# Patient Record
Sex: MALE | Race: WHITE | Employment: OTHER | URBAN - NONMETROPOLITAN AREA
[De-identification: names, ages, dates, MRNs, and addresses within clinical notes are randomized per-mention and may not be internally consistent; named-entity substitution may affect disease eponyms.]

---

## 2017-08-07 ENCOUNTER — RADIANT APPOINTMENT (OUTPATIENT)
Dept: GENERAL RADIOLOGY | Facility: CLINIC | Age: 62
End: 2017-08-07
Attending: FAMILY MEDICINE
Payer: COMMERCIAL

## 2017-08-07 ENCOUNTER — OFFICE VISIT (OUTPATIENT)
Dept: FAMILY MEDICINE | Facility: CLINIC | Age: 62
End: 2017-08-07
Payer: COMMERCIAL

## 2017-08-07 VITALS
WEIGHT: 180 LBS | HEART RATE: 68 BPM | RESPIRATION RATE: 18 BRPM | SYSTOLIC BLOOD PRESSURE: 144 MMHG | HEIGHT: 70 IN | BODY MASS INDEX: 25.77 KG/M2 | DIASTOLIC BLOOD PRESSURE: 90 MMHG

## 2017-08-07 DIAGNOSIS — Z71.89 ADVANCED DIRECTIVES, COUNSELING/DISCUSSION: ICD-10-CM

## 2017-08-07 DIAGNOSIS — Z12.11 COLON CANCER SCREENING: ICD-10-CM

## 2017-08-07 DIAGNOSIS — M19.041 PRIMARY OSTEOARTHRITIS OF RIGHT HAND: Primary | ICD-10-CM

## 2017-08-07 DIAGNOSIS — E78.2 MIXED HYPERLIPIDEMIA: ICD-10-CM

## 2017-08-07 DIAGNOSIS — M79.641 RIGHT HAND PAIN: ICD-10-CM

## 2017-08-07 DIAGNOSIS — Z00.00 ROUTINE GENERAL MEDICAL EXAMINATION AT A HEALTH CARE FACILITY: Primary | ICD-10-CM

## 2017-08-07 DIAGNOSIS — I10 ESSENTIAL HYPERTENSION, BENIGN: ICD-10-CM

## 2017-08-07 PROBLEM — R73.03 PREDIABETES: Status: ACTIVE | Noted: 2017-08-07

## 2017-08-07 LAB
ANION GAP SERPL CALCULATED.3IONS-SCNC: 8 MMOL/L (ref 3–14)
BUN SERPL-MCNC: 15 MG/DL (ref 7–30)
CALCIUM SERPL-MCNC: 9.4 MG/DL (ref 8.5–10.1)
CHLORIDE SERPL-SCNC: 107 MMOL/L (ref 94–109)
CHOLEST SERPL-MCNC: 220 MG/DL
CO2 SERPL-SCNC: 23 MMOL/L (ref 20–32)
CREAT SERPL-MCNC: 1.06 MG/DL (ref 0.66–1.25)
GFR SERPL CREATININE-BSD FRML MDRD: 70 ML/MIN/1.7M2
GLUCOSE SERPL-MCNC: 108 MG/DL (ref 70–99)
HDLC SERPL-MCNC: 47 MG/DL
LDLC SERPL CALC-MCNC: 139 MG/DL
NONHDLC SERPL-MCNC: 173 MG/DL
POTASSIUM SERPL-SCNC: 4 MMOL/L (ref 3.4–5.3)
SODIUM SERPL-SCNC: 138 MMOL/L (ref 133–144)
TRIGL SERPL-MCNC: 169 MG/DL

## 2017-08-07 PROCEDURE — 36415 COLL VENOUS BLD VENIPUNCTURE: CPT | Performed by: FAMILY MEDICINE

## 2017-08-07 PROCEDURE — 99396 PREV VISIT EST AGE 40-64: CPT | Performed by: FAMILY MEDICINE

## 2017-08-07 PROCEDURE — 80061 LIPID PANEL: CPT | Performed by: FAMILY MEDICINE

## 2017-08-07 PROCEDURE — 80048 BASIC METABOLIC PNL TOTAL CA: CPT | Performed by: FAMILY MEDICINE

## 2017-08-07 PROCEDURE — 73130 X-RAY EXAM OF HAND: CPT | Mod: RT

## 2017-08-07 PROCEDURE — 99213 OFFICE O/P EST LOW 20 MIN: CPT | Mod: 25 | Performed by: FAMILY MEDICINE

## 2017-08-07 RX ORDER — ATORVASTATIN CALCIUM 10 MG/1
10 TABLET, FILM COATED ORAL DAILY
Qty: 90 TABLET | Refills: 1 | Status: SHIPPED | OUTPATIENT
Start: 2017-08-07

## 2017-08-07 RX ORDER — VALSARTAN 40 MG/1
40 TABLET ORAL DAILY
Qty: 90 TABLET | Refills: 3 | Status: SHIPPED | OUTPATIENT
Start: 2017-08-07

## 2017-08-07 NOTE — MR AVS SNAPSHOT
After Visit Summary   8/7/2017    Mono Jennings    MRN: [id number]           Patient Information     Date Of Birth          1955        Visit Information        Provider Department      8/7/2017 8:00 AM Jason Schmid MD [facility]        Today's Diagnoses     Colon cancer screening    -  1    Routine general medical examination at a health care facility        Mixed hyperlipidemia        Essential hypertension, benign        Right hand pain          Care Instructions      Preventive Health Recommendations  Male Ages 50 - 64    Yearly exam:             See your health care provider every year in order to  o   Review health changes.   o   Discuss preventive care.    o   Review your medicines if your doctor has prescribed any.     Have a cholesterol test every 5 years, or more frequently if you are at risk for high cholesterol/heart disease.     Have a diabetes test (fasting glucose) every three years. If you are at risk for diabetes, you should have this test more often.     Have a colonoscopy at age 50, or have a yearly FIT test (stool test). These exams will check for colon cancer.      Talk with your health care provider about whether or not a prostate cancer screening test (PSA) is right for you.    You should be tested each year for STDs (sexually transmitted diseases), if you re at risk.     Shots: Get a flu shot each year. Get a tetanus shot every 10 years.     Nutrition:    Eat at least 5 servings of fruits and vegetables daily.     Eat whole-grain bread, whole-wheat pasta and brown rice instead of white grains and rice.     Talk to your provider about Calcium and Vitamin D.     Lifestyle    Exercise for at least 150 minutes a week (30 minutes a day, 5 days a week). This will help you control your weight and prevent disease.     Limit alcohol to one drink per day.     No smoking.     Wear sunscreen to prevent skin cancer.     See your dentist every six months for an exam  and cleaning.     See your eye doctor every 1 to 2 years.            Follow-ups after your visit        Additional Services     GASTROENTEROLOGY ADULT REF PROCEDURE ONLY       Last Lab Result: No results found for: CR  Body mass index is 26.2 kg/(m^2).     Needed:  No  Language:  English    Patient will be contacted to schedule procedure.     Please be aware that coverage of these services is subject to the terms and limitations of your health insurance plan.  Call member services at your health plan with any benefit or coverage questions.  Any procedures must be performed at a [place] [facility] facility OR coordinated by your clinic's referral office.    Please bring the following with you to your appointment:    (1) Any X-Rays, CTs or MRIs which have been performed.  Contact the facility where they were done to arrange for  prior to your scheduled appointment.    (2) List of current medications   (3) This referral request   (4) Any documents/labs given to you for this referral                  Future tests that were ordered for you today     Open Future Orders        Priority Expected Expires Ordered    XR Hand Right G/E 3 Views STAT 8/7/2017 8/7/2018 8/7/2017            Who to contact     If you have questions or need follow up information about today's clinic visit or your schedule please contact [facility] directly at [phone].  Normal or non-critical lab and imaging results will be communicated to you by MyChart, letter or phone within 4 business days after the clinic has received the results. If you do not hear from us within 7 days, please contact the clinic through MyChart or phone. If you have a critical or abnormal lab result, we will notify you by phone as soon as possible.  Submit refill requests through [employer] or call your pharmacy and they will forward the refill request to us. Please allow 3 business days for your refill to be completed.          Additional Information  "About Your Visit        [employer] Information     [employer] lets you send messages to your doctor, view your test results, renew your prescriptions, schedule appointments and more. To sign up, go to [email or web address] . Click on \"Log in\" on the left side of the screen, which will take you to the Welcome page. Then click on \"Sign up Now\" on the right side of the page.     You will be asked to enter the access code listed below, as well as some personal information. Please follow the directions to create your username and password.     Your access code is: [email or web address]  Expires: 2017  8:22 AM     Your access code will  in 90 days. If you need help or a new code, please call your [facility] clinic or [phone].        Care EveryWhere ID     This is your Care EveryWhere ID. This could be used by other organizations to access your [facility] medical records  [id number]        Your Vitals Were     Pulse Respirations Height BMI (Body Mass Index)          68 18 5' 9.5\" (1.765 m) 26.2 kg/m2         Blood Pressure from Last 3 Encounters:   17 144/90   10/19/16 128/76   16 (!) 160/100    Weight from Last 3 Encounters:   17 180 lb (81.6 kg)   10/19/16 183 lb 8 oz (83.2 kg)   16 181 lb (82.1 kg)              We Performed the Following     Basic metabolic panel  (Ca, Cl, CO2, Creat, Gluc, K, Na, BUN)     GASTROENTEROLOGY ADULT REF PROCEDURE ONLY     LIPID REFLEX TO DIRECT LDL PANEL          Today's Medication Changes          These changes are accurate as of: 17  8:22 AM.  If you have any questions, ask your nurse or doctor.               These medicines have changed or have updated prescriptions.        Dose/Directions    atorvastatin 10 MG tablet   Commonly known as:  LIPITOR   This may have changed:    - medication strength  - how much to take   Used for:  Mixed hyperlipidemia   Changed by:  Jason Schmid MD        Dose:  10 mg   Take 1 tablet (10 mg) by mouth daily   Quantity:  90 " tablet   Refills:  1            Where to get your medicines      These medications were [id number] [place] [place] [facility] [address]  [address] , [facility] [address]     Phone:  [phone]     atorvastatin 10 MG tablet    valsartan 40 MG tablet                Primary Care Provider    None Specified       No primary provider on file.        Equal Access to Services     [facility]: Hadii nancy ku hadasho Soomaali, waaxda luqadaha, qaybta kaalmada adeegyada, iveth harman hayanselmo salas . So [facility] [phone].    ATENCIÓN: Si habla español, tiene a prajapati disposición servicios gratuitos de asistencia lingüística. Rolf al [phone].    We comply with applicable federal civil rights laws and [place] laws. We do not discriminate on the basis of race, color, national origin, age, disability sex, sexual orientation or gender identity.            Thank you!     Thank you for choosing [facility]  for your care. Our goal is always to provide you with excellent care. Hearing back from our patients is one way we can continue to improve our services. Please take a few minutes to complete the written survey that you may receive in the mail after your visit with us. Thank you!             Your Updated Medication List - Protect others around you: Learn how to safely use, store and throw away your medicines at www.disposemymeds.org.          This list is accurate as of: 8/7/17  8:22 AM.  Always use your most recent med list.                   Brand Name Dispense Instructions for use Diagnosis    aspirin 325 MG tablet      Take 325 mg by mouth        atorvastatin 10 MG tablet    LIPITOR    90 tablet    Take 1 tablet (10 mg) by mouth daily    Mixed hyperlipidemia       LANSOPRAZOLE PO      Take by mouth every morning (before breakfast)        valsartan 40 MG tablet    DIOVAN    90 tablet    Take 1 tablet (40 mg) by mouth daily    Essential hypertension, benign

## 2017-08-07 NOTE — LETTER
Mono Jennings  [address] [facility] [address]        August 11, 2017    Dear ,    Your cholesterol came back high, take lipitor and aspirin as discussed. Blood glucose reading consistent with borderline diabetes. Continue regular exercise and balanced diet. Let us know if there are any questions.    Results for orders placed or performed in visit on 08/07/17   LIPID REFLEX TO DIRECT LDL PANEL   Result Value Ref Range    Cholesterol 220 (H) <200 mg/dL    Triglycerides 169 (H) <150 mg/dL    HDL Cholesterol 47 >39 mg/dL    LDL Cholesterol Calculated 139 (H) <100 mg/dL    Non HDL Cholesterol 173 (H) <130 mg/dL   Basic metabolic panel  (Ca, Cl, CO2, Creat, Gluc, K, Na, BUN)   Result Value Ref Range    Sodium 138 133 - 144 mmol/L    Potassium 4.0 3.4 - 5.3 mmol/L    Chloride 107 94 - 109 mmol/L    Carbon Dioxide 23 20 - 32 mmol/L    Anion Gap 8 3 - 14 mmol/L    Glucose 108 (H) 70 - 99 mg/dL    Urea Nitrogen 15 7 - 30 mg/dL    Creatinine 1.06 0.66 - 1.25 mg/dL    GFR Estimate 70 >60 mL/min/1.7m2    GFR Estimate If Black 85 >60 mL/min/1.7m2    Calcium 9.4 8.5 - 10.1 mg/dL      Sincerely,        Jason Schmid MD

## 2017-08-07 NOTE — PROGRESS NOTES
SUBJECTIVE:   CC: Mono Jennings is an 62 year old male who presents for preventative health visit.     Healthy Habits:    Do you get at least three servings of calcium containing foods daily (dairy, green leafy vegetables, etc.)? yes    Amount of exercise or daily activities, outside of work: 3 day(s) per week    Problems taking medications regularly No    Medication side effects: No    Have you had an eye exam in the past two years? yes    Do you see a dentist twice per year? yes    Do you have sleep apnea, excessive snoring or daytime drowsiness?no        PROBLEMS TO ADD ON...  Right hand middle fingers knuckle is sore- not sure if its arthritis. Been sore for over a year now. But getting worse. No known injury.     Today's PHQ-2 Score:   PHQ-2 ( 1999 [employer]) 8/7/2017 8/2/2016   Q1: Little interest or pleasure in doing things 0 0   Q2: Feeling down, depressed or hopeless 0 0   PHQ-2 Score 0 0         Abuse: Current or Past(Physical, Sexual or Emotional)- No  Do you feel safe in your environment - Yes  Social History   Substance Use Topics     Smoking status: Never Smoker     Smokeless tobacco: Never Used     Alcohol use No       The patient does not drink >3 drinks per day nor >7 drinks per week.    Last PSA: No results found for: PSA    Reviewed orders with patient. Reviewed health maintenance and updated orders accordingly - Yes  Labs reviewed in EPIC  BP Readings from Last 3 Encounters:   08/07/17 144/90   10/19/16 128/76   08/02/16 (!) 160/100    Wt Readings from Last 3 Encounters:   08/07/17 180 lb (81.6 kg)   10/19/16 183 lb 8 oz (83.2 kg)   08/02/16 181 lb (82.1 kg)                  Patient Active Problem List   Diagnosis     Hypertension with goal blood pressure less than 130/80     Esophageal reflux     Vertigo     Past Surgical History:   Procedure Laterality Date     HERNIA REPAIR         Social History   Substance Use Topics     Smoking status: Never Smoker     Smokeless tobacco: Never Used      "Alcohol use No     Family History   Problem Relation Age of Onset     DIABETES Mother      DIABETES Sister          Current Outpatient Prescriptions   Medication Sig Dispense Refill     atorvastatin (LIPITOR) 10 MG tablet Take 1 tablet (10 mg) by mouth daily 90 tablet 1     valsartan (DIOVAN) 40 MG tablet Take 1 tablet (40 mg) by mouth daily 90 tablet 3     aspirin 325 MG tablet Take 325 mg by mouth       LANSOPRAZOLE PO Take by mouth every morning (before breakfast)       [DISCONTINUED] valsartan (DIOVAN) 40 MG tablet Take 1 tablet (40 mg) by mouth daily 90 tablet 3     [DISCONTINUED] atorvastatin (LIPITOR) 20 MG tablet Take 1 tablet (20 mg) by mouth daily 90 tablet 3     Allergies   Allergen Reactions     Ciprofloxacin Rash     Recent Labs   Lab Test  08/02/16   0908   LDL  144*   HDL  46   TRIG  225*              Reviewed and updated as needed this visit by clinical staffTobacco  Allergies  Meds  Med Hx  Surg Hx  Fam Hx  Soc Hx        Reviewed and updated as needed this visit by Provider            ROS:  C: NEGATIVE for fever, chills, change in weight  I: NEGATIVE for worrisome rashes, moles or lesions  E: NEGATIVE for vision changes or irritation  ENT: NEGATIVE for ear, mouth and throat problems  R: NEGATIVE for significant cough or SOB  CV: NEGATIVE for chest pain, palpitations or peripheral edema  GI: NEGATIVE for nausea, abdominal pain, heartburn, or change in bowel habits   male: negative for dysuria, hematuria, decreased urinary stream, erectile dysfunction, urethral discharge  MUSCULOSKELETAL:as above   N: NEGATIVE for weakness, dizziness or paresthesias  P: NEGATIVE for changes in mood or affect    OBJECTIVE:   /90 (Cuff Size: Adult Regular)  Pulse 68  Resp 18  Ht 5' 9.5\" (1.765 m)  Wt 180 lb (81.6 kg)  BMI 26.2 kg/m2  EXAM:  GENERAL: healthy, alert and no distress  EYES: Eyes grossly normal to inspection, PERRL and conjunctivae and sclerae normal  HENT: ear canals and TM's normal, nose " and mouth without ulcers or lesions  NECK: no adenopathy, no asymmetry, masses, or scars and thyroid normal to palpation  RESP: lungs clear to auscultation - no rales, rhonchi or wheezes  CV: regular rate and rhythm, normal S1 S2, no S3 or S4, no murmur, click or rub, no peripheral edema and peripheral pulses strong  ABDOMEN: soft, nontender, no hepatosplenomegaly, no masses and bowel sounds normal  MS: Chronic osteoarthritic changes involving right metacarpophalangeal joints  NEURO: Normal strength and tone, mentation intact and speech normal  PSYCH: mentation appears normal, affect normal/bright  LYMPH: no cervical, supraclavicular, axillary, or inguinal adenopathy    RIGHT HAND THREE OR MORE VIEWS  8/7/2017 8:38 AM      HISTORY: Pain over knuckles for about 1 year. Pain in right hand.     COMPARISON: None.         IMPRESSION:  No acute osseous abnormality. No radiopaque foreign body.  There is a well-corticated density in the ulnar carpal joint, this may  represent an old ulnar styloid fracture or joint body. Mild  degenerative changes are seen at  radiocarpal joint. Joint space  narrowing is seen at the third carpometacarpal phalangeal joint space.        ASSESSMENT/PLAN:       ICD-10-CM    1. Routine general medical examination at a health care facility Z00.00    2. Colon cancer screening Z12.11 GASTROENTEROLOGY ADULT REF PROCEDURE ONLY   3. Mixed hyperlipidemia E78.2 LIPID REFLEX TO DIRECT LDL PANEL     atorvastatin (LIPITOR) 10 MG tablet   4. Essential hypertension, benign I10 valsartan (DIOVAN) 40 MG tablet     Basic metabolic panel  (Ca, Cl, CO2, Creat, Gluc, K, Na, BUN)   5. Right hand pain M79.641 XR Hand Right G/E 3 Views    Probably related to osteoarthritis   6. 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 X-ray ordered, will consider sports medicine consult for further review and recommendations. Suggested  "to continue over the counter analgesia for pain control. Recommended to try lower dose of Lipitor rather than stopping completely, 10 year risk for cardiovascular disease is 19.6%. Continue aspirin and Diovan. Patient has been following dentist twice yearly. Advance care directive counseling provided, form given, suggested to leave a copy once completed.      COUNSELING:  Reviewed preventive health counseling, as reflected in patient instructions       reports that he has never smoked. He has never used smokeless tobacco.      Estimated body mass index is 26.2 kg/(m^2) as calculated from the following:    Height as of this encounter: 5' 9.5\" (1.765 m).    Weight as of this encounter: 180 lb (81.6 kg).         Counseling Resources:  ATP IV Guidelines  Pooled Cohorts Equation Calculator  FRAX Risk Assessment  ICSI Preventive Guidelines  Dietary Guidelines for Americans, 2010  USDA's MyPlate  ASA Prophylaxis  Lung CA Screening        Jason Schmid MD  [facility]  "

## 2017-08-07 NOTE — NURSING NOTE
"Chief Complaint   Patient presents with     Physical       Initial /90 (Cuff Size: Adult Regular)  Pulse 68  Resp 18  Ht 5' 9.5\" (1.765 m)  Wt 180 lb (81.6 kg)  BMI 26.2 kg/m2 Estimated body mass index is 26.2 kg/(m^2) as calculated from the following:    Height as of this encounter: 5' 9.5\" (1.765 m).    Weight as of this encounter: 180 lb (81.6 kg).  Medication Reconciliation: complete    Health Maintenance that is potentially due pending provider review:  Colonoscopy/FIT    Gave pt phone number/pended order to schedule mammo and/or colonoscopy(or FIT)    Is there anyone who you would like to be able to receive your results? Not Applicable  If yes have patient fill out ABE    "

## 2017-08-14 ENCOUNTER — ANESTHESIA EVENT (OUTPATIENT)
Dept: GASTROENTEROLOGY | Facility: CLINIC | Age: 62
End: 2017-08-14
Payer: COMMERCIAL

## 2017-08-14 NOTE — ANESTHESIA PREPROCEDURE EVALUATION
Anesthesia Evaluation     . Pt has had prior anesthetic.            ROS/MED HX    ENT/Pulmonary:       Neurologic:     (+)other neuro vertigo    Cardiovascular:     (+) hypertension----. : . . . :. .       METS/Exercise Tolerance:     Hematologic:         Musculoskeletal:         GI/Hepatic:     (+) GERD       Renal/Genitourinary:         Endo:     (+) Obesity, Other Endocrine Disorder prediabetes.      Psychiatric:         Infectious Disease:         Malignancy:         Other:                     Physical Exam  Normal systems: cardiovascular, pulmonary and dental    Airway   Mallampati: I  TM distance: >3 FB  Neck ROM: full    Dental     Cardiovascular   Rhythm and rate: regular and normal      Pulmonary    breath sounds clear to auscultation                    Anesthesia Plan      History & Physical Review  History and physical reviewed and following examination; no interval change.    ASA Status:  2 .    NPO Status:  > 6 hours    Plan for MAC Reason for MAC:  Deep or markedly invasive procedure (G8)         Postoperative Care      Consents  Anesthetic plan, risks, benefits and alternatives discussed with:  Patient..                          .

## 2017-08-15 ENCOUNTER — ANESTHESIA (OUTPATIENT)
Dept: GASTROENTEROLOGY | Facility: CLINIC | Age: 62
End: 2017-08-15
Payer: COMMERCIAL

## 2017-08-15 ENCOUNTER — SURGERY (OUTPATIENT)
Age: 62
End: 2017-08-15

## 2017-08-15 ENCOUNTER — HOSPITAL ENCOUNTER (OUTPATIENT)
Facility: CLINIC | Age: 62
Discharge: HOME | End: 2017-08-15
Attending: SURGERY | Admitting: SURGERY
Payer: COMMERCIAL

## 2017-08-15 VITALS
WEIGHT: 180 LBS | TEMPERATURE: 97.8 F | RESPIRATION RATE: 16 BRPM | HEIGHT: 70 IN | SYSTOLIC BLOOD PRESSURE: 130 MMHG | OXYGEN SATURATION: 96 % | DIASTOLIC BLOOD PRESSURE: 86 MMHG | BODY MASS INDEX: 25.77 KG/M2

## 2017-08-15 LAB — COLONOSCOPY: NORMAL

## 2017-08-15 PROCEDURE — 25000125 ZZHC RX 250: Performed by: SURGERY

## 2017-08-15 PROCEDURE — 25000128 H RX IP 250 OP 636: Performed by: SURGERY

## 2017-08-15 PROCEDURE — G0121 COLON CA SCRN NOT HI RSK IND: HCPCS | Performed by: SURGERY

## 2017-08-15 PROCEDURE — 25000128 H RX IP 250 OP 636: Performed by: NURSE ANESTHETIST, CERTIFIED REGISTERED

## 2017-08-15 PROCEDURE — 37000008 ZZH ANESTHESIA TECHNICAL FEE, 1ST 30 MIN: Performed by: SURGERY

## 2017-08-15 PROCEDURE — 45378 DIAGNOSTIC COLONOSCOPY: CPT | Performed by: SURGERY

## 2017-08-15 RX ORDER — PROPOFOL 10 MG/ML
INJECTION, EMULSION INTRAVENOUS PRN
Status: DISCONTINUED | OUTPATIENT
Start: 2017-08-15 | End: 2017-08-15

## 2017-08-15 RX ORDER — SODIUM CHLORIDE, SODIUM LACTATE, POTASSIUM CHLORIDE, CALCIUM CHLORIDE 600; 310; 30; 20 MG/100ML; MG/100ML; MG/100ML; MG/100ML
INJECTION, SOLUTION INTRAVENOUS CONTINUOUS
Status: DISCONTINUED | OUTPATIENT
Start: 2017-08-15 | End: 2017-08-15 | Stop reason: HOSPADM

## 2017-08-15 RX ORDER — ONDANSETRON 2 MG/ML
4 INJECTION INTRAMUSCULAR; INTRAVENOUS
Status: DISCONTINUED | OUTPATIENT
Start: 2017-08-15 | End: 2017-08-15 | Stop reason: HOSPADM

## 2017-08-15 RX ORDER — LIDOCAINE 40 MG/G
CREAM TOPICAL
Status: DISCONTINUED | OUTPATIENT
Start: 2017-08-15 | End: 2017-08-15 | Stop reason: HOSPADM

## 2017-08-15 RX ADMIN — SODIUM CHLORIDE, POTASSIUM CHLORIDE, SODIUM LACTATE AND CALCIUM CHLORIDE: 600; 310; 30; 20 INJECTION, SOLUTION INTRAVENOUS at 09:40

## 2017-08-15 RX ADMIN — PROPOFOL 100 MG: 10 INJECTION, EMULSION INTRAVENOUS at 10:15

## 2017-08-15 RX ADMIN — PROPOFOL 100 MG: 10 INJECTION, EMULSION INTRAVENOUS at 10:09

## 2017-08-15 RX ADMIN — PROPOFOL 100 MG: 10 INJECTION, EMULSION INTRAVENOUS at 10:10

## 2017-08-15 RX ADMIN — LIDOCAINE HYDROCHLORIDE 1 ML: 10 INJECTION, SOLUTION EPIDURAL; INFILTRATION; INTRACAUDAL; PERINEURAL at 09:40

## 2017-08-15 NOTE — BRIEF OP NOTE
[facility]   Brief Operative Note    Pre-operative diagnosis: screening   Post-operative diagnosis moderate sigmoid diverticulosis, otherwise normal   Procedure: Procedure(s):  Colonoscopy - Wound Class: II-Clean Contaminated   Surgeon(s): Surgeon(s) and Role:     * Denny Avila MD - Primary   Estimated blood loss: * No values recorded between 8/15/2017 12:00 AM and 8/15/2017 10:22 AM *    Specimens: * No specimens in log *   Findings: 1.  Moderate sigmoid diverticulosis  2.  Colon otherwise normal

## 2017-08-15 NOTE — ANESTHESIA POSTPROCEDURE EVALUATION
Patient: Mono Jennings    Procedure(s):  Colonoscopy - Wound Class: II-Clean Contaminated    Diagnosis:screening  Diagnosis Additional Information: No value filed.    Anesthesia Type:  MAC    Note:  Anesthesia Post Evaluation    Patient location during evaluation: Bedside  Patient participation: Able to fully participate in evaluation  Level of consciousness: awake and alert  Pain management: adequate  Airway patency: patent  Cardiovascular status: acceptable  Respiratory status: acceptable  Hydration status: acceptable  PONV: none     Anesthetic complications: None          Last vitals:  Vitals:    08/15/17 0920   BP: (!) 157/99   Resp: 18   Temp: 36.6  C (97.8  F)   SpO2: 98%         Electronically Signed By: PB Simmons CRNA  August 15, 2017  10:26 AM

## 2017-08-15 NOTE — H&P
"62 year old year old male here for colonoscopy for screening.    Patient Active Problem List   Diagnosis     Hypertension with goal blood pressure less than 130/80     Esophageal reflux     Vertigo     Advanced directives, counseling/discussion     Prediabetes       History reviewed. No pertinent past medical history.    Past Surgical History:   Procedure Laterality Date     HERNIA REPAIR         @FMX@    No current outpatient prescriptions on file.       Allergies   Allergen Reactions     Ciprofloxacin Rash       Pt reports that he has never smoked. He has never used smokeless tobacco. He reports that he does not drink alcohol or use illicit drugs.    Exam:  BP (!) 157/99  Temp 97.8  F (36.6  C) (Oral)  Resp 18  Ht 1.765 m (5' 9.5\")  Wt 81.6 kg (180 lb)  SpO2 98%  BMI 26.2 kg/m2    Awake, Alert OX3  Lungs - CTA bilaterally  CV - RRR, no murmurs, distal pulses intact  Abd - soft, non-distended, non-tender, +BS  Extr - No cyanosis or edema    A/P 62 year old year old male in need of colonoscopy for screening. Risks, benefits, alternatives, and complications were discussed including the possibility of perforation and the patient agreed to proceed    Denny Avila MD   "

## 2017-08-17 PROBLEM — I10 ESSENTIAL HYPERTENSION, BENIGN: Status: ACTIVE | Noted: 2017-08-17

## 2017-08-17 PROBLEM — E78.2 MIXED HYPERLIPIDEMIA: Status: ACTIVE | Noted: 2017-08-17

## 2017-08-22 ENCOUNTER — OFFICE VISIT (OUTPATIENT)
Dept: ORTHOPEDICS | Facility: CLINIC | Age: 62
End: 2017-08-22
Payer: COMMERCIAL

## 2017-08-22 VITALS
DIASTOLIC BLOOD PRESSURE: 93 MMHG | HEIGHT: 70 IN | WEIGHT: 180 LBS | SYSTOLIC BLOOD PRESSURE: 169 MMHG | BODY MASS INDEX: 25.77 KG/M2

## 2017-08-22 DIAGNOSIS — M19.041 PRIMARY OSTEOARTHRITIS, RIGHT HAND: ICD-10-CM

## 2017-08-22 DIAGNOSIS — M25.541 ARTHRALGIA OF RIGHT HAND: Primary | ICD-10-CM

## 2017-08-22 PROCEDURE — 99243 OFF/OP CNSLTJ NEW/EST LOW 30: CPT | Performed by: FAMILY MEDICINE

## 2017-08-22 NOTE — MR AVS SNAPSHOT
"              After Visit Summary   2017    Mono Jennings    MRN: [id number]           Patient Information     Date Of Birth          1955        Visit Information        Provider Department      2017 9:20 AM Iker Hubbard,  [facility]        Today's Diagnoses     Arthralgia of right hand    -  1    Primary osteoarthritis, right hand           Follow-ups after your visit        Who to contact     If you have questions or need follow up information about today's clinic visit or your schedule please contact [facility] directly at [phone].  Normal or non-critical lab and imaging results will be communicated to you by [employer]ge[employer], letter or phone within 4 business days after the clinic has received the results. If you do not hear from us within 7 days, please contact the clinic through [employer] or phone. If you have a critical or abnormal lab result, we will notify you by phone as soon as possible.  Submit refill requests through [employer] or call your pharmacy and they will forward the refill request to us. Please allow 3 business days for your refill to be completed.          Additional Information About Your Visit        MyChart Information     [employer] lets you send messages to your doctor, view your test results, renew your prescriptions, schedule appointments and more. To sign up, go to [email or web address] . Click on \"Log in\" on the left side of the screen, which will take you to the Welcome page. Then click on \"Sign up Now\" on the right side of the page.     You will be asked to enter the access code listed below, as well as some personal information. Please follow the directions to create your username and password.     Your access code is: [email or web address]  Expires: 2017  8:22 AM     Your access code will  in 90 days. If you need help or a new code, please call your [place] clinic or [phone].        Care " "EveryWhere ID     This is your Care EveryWhere ID. This could be used by other organizations to access your [place] medical records  [id number]        Your Vitals Were     Height BMI (Body Mass Index)                5' 9.5\" (1.765 m) 26.2 kg/m2           Blood Pressure from Last 3 Encounters:   08/24/17 155/90   08/22/17 (!) 169/93   08/15/17 130/86    Weight from Last 3 Encounters:   08/24/17 180 lb (81.6 kg)   08/22/17 180 lb (81.6 kg)   08/15/17 180 lb (81.6 kg)              Today, you had the following     No orders found for display       Primary Care Provider    None Specified       No primary provider on file.        Equal Access to Services     ELA KUO : Tricia López, wagabriella wasserman, qaybta kaalmada lola, iveth salas . So [facility] [phone].    ATENCIÓN: Si habla español, tiene a prajapati disposición servicios gratuitos de asistencia lingüística. Llame al [phone].    We comply with applicable federal civil rights laws and [place] laws. We do not discriminate on the basis of race, color, national origin, age, disability sex, sexual orientation or gender identity.            Thank you!     Thank you for choosing [facility]  for your care. Our goal is always to provide you with excellent care. Hearing back from our patients is one way we can continue to improve our services. Please take a few minutes to complete the written survey that you may receive in the mail after your visit with us. Thank you!             Your Updated Medication List - Protect others around you: Learn how to safely use, store and throw away your medicines at www.disposemymeds.org.          This list is accurate as of: 8/22/17 11:59 PM.  Always use your most recent med list.                   Brand Name Dispense Instructions for use Diagnosis    aspirin 325 MG tablet      Take 325 mg by mouth        atorvastatin 10 MG tablet    LIPITOR    90 tablet "    Take 1 tablet (10 mg) by mouth daily    Mixed hyperlipidemia       LANSOPRAZOLE PO      Take by mouth every morning (before breakfast)        valsartan 40 MG tablet    DIOVAN    90 tablet    Take 1 tablet (40 mg) by mouth daily    Essential hypertension, benign

## 2017-08-22 NOTE — PROGRESS NOTES
"Mono Jennings  :  1955  DOS: 2017  MRN: [id number]    Sports Medicine Clinic Visit    PCP: No primary care provider on file.    Mono Jennings is a 62 year old Right hand dominant male who is seen in consultation at the request of  Jason Schmid M.D. presenting with right hand, middle finger pain.    Injury: Chronic intermittent right middle finger, hand pain over last several years, worsening pain over last 3+ months.  Pain located over right middle finger, MCP joint, nonradiating.  Additional Features:  Positive: swelling, grinding and weakness.  Symptoms are better with Ibuprofen and Rest.  Symptoms are worse with: gripping/grasping objects.  Other evaluation and/or treatments so far consists of: Tylenol, Ibuprofen, Rest and PCP consult.  Recent imaging completed: X-rays completed 17.  Prior History of related problems: none    Social History: retired    Review of Systems  Musculoskeletal: as above  Remainder of review of systems is negative including constitutional, CV, pulmonary, GI, Skin and Neurologic except as noted in HPI or medical history.    No past medical history on file.  Past Surgical History:   Procedure Laterality Date     COLONOSCOPY N/A 8/15/2017    Procedure: COLONOSCOPY;  Colonoscopy;  Surgeon: Denny Avila MD;  Location: [facility]     HERNIA REPAIR         Objective  BP (!) 169/93  Ht 5' 9.5\" (1.765 m)  Wt 180 lb (81.6 kg)  BMI 26.2 kg/m2    General: healthy, alert and in no distress    HEENT: no scleral icterus or conjunctival erythema   Skin: no suspicious lesions or rash. No jaundice.   CV: regular rhythm by palpation, 2+ distal pulses, no pedal edema    Resp: normal respiratory effort without conversational dyspnea   Psych: normal mood and affect    Gait: nonantalgic, appropriate coordination and balance   Neuro: normal light touch sensory exam of the extremities. Motor strength as noted below     Right Wrist and Hand exam    Inspection:       Swelling: 3rd MCP, mild, " nor redness, induration or drainage    Tender:       MCP joint of 3rd digit(s)  right    Non Tender:       Remainder of the Wrist and Hand right,      distal radius right,      anatomic snuffbox right,      scapholunate interval right and      TFCC right    ROM:       Decreased active and passive ROM of the 3rd MCP with flexion and extension right    Strength:       5/5 strength in the muscles of the hand, wrist and forearm right    Neurovascular:       2+ radial pulses bilaterally with brisk capillary refill and      normal sensation to light touch in the radial, median and ulnar nerve distributions    Radiology:  Results for orders placed or performed in visit on 08/07/17   XR Hand Right G/E 3 Views    Narrative    RIGHT HAND THREE OR MORE VIEWS  8/7/2017 8:38 AM     HISTORY: Pain over knuckles for about 1 year. Pain in right hand.    COMPARISON: None.      Impression    IMPRESSION:  No acute osseous abnormality. No radiopaque foreign body.  There is a well-corticated density in the ulnar carpal joint, this may  represent an old ulnar styloid fracture or joint body. Mild  degenerative changes are seen at  radiocarpal joint. Joint space  narrowing is seen at the third carpometacarpal phalangeal joint space.      AMADEO NAYAK DO       Assessment:  1. Arthralgia of right hand    2. Primary osteoarthritis, right hand        Plan:  Discussed the assessment with the patient.  Follow up: 2 days for US guided MCP joint CSI  ZHOU reviewed  Limiting motion with splint over MCP reviewed  Activity modification reviewed  XR images independently visualized and reviewed with patient today in clinic  Moderate OA at 3rd MCP matches sx  Consider further referral for worsening pain mot improved with conservative care  Home handouts provided and supportive care reviewed  All questions were answered today  Contact us with additional questions or concerns  Signs and sx of concern reviewed    Thanks very much for sending this nice  gentleman to us, I will keep you updated with his progress      Iker Hubbard DO, CACHRIS  Primary Care Sports Medicine  [facility]             Disclaimer: This note consists of symbols derived from keyboarding, dictation and/or voice recognition software. As a result, there may be errors in the script that have gone undetected. Please consider this when interpreting information found in this chart.

## 2017-08-22 NOTE — NURSING NOTE
"Chief Complaint   Patient presents with     Musculoskeletal Problem     right middle finger pain       Initial BP (!) 169/93  Ht 5' 9.5\" (1.765 m)  Wt 180 lb (81.6 kg)  BMI 26.2 kg/m2 Estimated body mass index is 26.2 kg/(m^2) as calculated from the following:    Height as of this encounter: 5' 9.5\" (1.765 m).    Weight as of this encounter: 180 lb (81.6 kg).  Medication Reconciliation: complete     Espinoza Pedersen ATC  "

## 2017-08-24 ENCOUNTER — OFFICE VISIT (OUTPATIENT)
Dept: ORTHOPEDICS | Facility: CLINIC | Age: 62
End: 2017-08-24
Payer: COMMERCIAL

## 2017-08-24 VITALS
BODY MASS INDEX: 25.77 KG/M2 | DIASTOLIC BLOOD PRESSURE: 90 MMHG | WEIGHT: 180 LBS | HEIGHT: 70 IN | SYSTOLIC BLOOD PRESSURE: 155 MMHG

## 2017-08-24 DIAGNOSIS — M19.041 PRIMARY OSTEOARTHRITIS OF RIGHT HAND: Primary | ICD-10-CM

## 2017-08-24 PROCEDURE — 20604 DRAIN/INJ JOINT/BURSA W/US: CPT | Mod: RT | Performed by: FAMILY MEDICINE

## 2017-08-24 RX ORDER — TRIAMCINOLONE ACETONIDE 40 MG/ML
20 INJECTION, SUSPENSION INTRA-ARTICULAR; INTRAMUSCULAR ONCE
Qty: 0.5 ML | Refills: 0 | OUTPATIENT
Start: 2017-08-24 | End: 2017-08-24

## 2017-08-24 NOTE — MR AVS SNAPSHOT
"              After Visit Summary   2017    Mono Jennings    MRN: [id number]           Patient Information     Date Of Birth          1955        Visit Information        Provider Department      2017 3:20 PM Iker Hubbard, DO [facility]        Today's Diagnoses     Primary osteoarthritis of right hand    -  1       Follow-ups after your visit        Who to contact     If you have questions or need follow up information about today's clinic visit or your schedule please contact [facility] directly at [phone].  Normal or non-critical lab and imaging results will be communicated to you by [employer], letter or phone within 4 business days after the clinic has received the results. If you do not hear from us within 7 days, please contact the clinic through [employer] or phone. If you have a critical or abnormal lab result, we will notify you by phone as soon as possible.  Submit refill requests through [facility] or call your pharmacy and they will forward the refill request to us. Please allow 3 business days for your refill to be completed.          Additional Information About Your Visit        MyChart Information     [facility] lets you send messages to your doctor, view your test results, renew your prescriptions, schedule appointments and more. To sign up, go to [email or web address] . Click on \"Log in\" on the left side of the screen, which will take you to the Welcome page. Then click on \"Sign up Now\" on the right side of the page.     You will be asked to enter the access code listed below, as well as some personal information. Please follow the directions to create your username and password.     Your access code is: [email or web address]  Expires: 2017  8:22 AM     Your access code will  in 90 days. If you need help or a new code, please call your [place] clinic or [phone].        Care EveryWhere ID     This is your " "Care EveryWhere ID. This could be used by other organizations to access your [place] medical records  [id number]        Your Vitals Were     Height BMI (Body Mass Index)                5' 9.5\" (1.765 m) 26.2 kg/m2           Blood Pressure from Last 3 Encounters:   08/24/17 155/90   08/22/17 (!) 169/93   08/15/17 130/86    Weight from Last 3 Encounters:   08/24/17 180 lb (81.6 kg)   08/22/17 180 lb (81.6 kg)   08/15/17 180 lb (81.6 kg)              We Performed the Following     HC ARTHROCNT ASPIR&/INJ SMALL JT/BURSAW/US REC RPRT     TRIAMCINOLONE ACET INJ NOS          Today's Medication Changes          These changes are accurate as of: 8/24/17  4:21 PM.  If you have any questions, ask your nurse or doctor.               Start taking these medicines.        Dose/Directions    triamcinolone acetonide 40 MG/ML injection   Commonly known as:  KENALOG-40   Used for:  Primary osteoarthritis of right hand   Started by:  Iker Hubbard,         Dose:  20 mg   0.5 mLs (20 mg) by INTRA-ARTICULAR route once for 1 dose   Quantity:  0.5 mL   Refills:  0            Where to get your medicines      Some of these will need a paper prescription and others can be bought over the counter.  Ask your nurse if you have questions.     You don't need a prescription for these medications     triamcinolone acetonide 40 MG/ML injection                Primary Care Provider    None Specified       No primary provider on file.        Equal Access to Services     ELA KUO : Tricia López, kamille wasserman, qaeileen kaaliveth [facility] Manorvanessa ramirez. So [facility] [phone].    ATENCIÓN: Si habla kodyañol, tiene a prajapati disposición servicios gratuitos de asistencia lingüística. Llame al [phone].    We comply with applicable federal civil rights laws and [place] laws. We do not discriminate on the basis of race, color, national origin, age, disability sex, sexual orientation or gender " identity.            Thank you!     Thank you for choosing [place] [facility]  for your care. Our goal is always to provide you with excellent care. Hearing back from our patients is one way we can continue to improve our services. Please take a few minutes to complete the written survey that you may receive in the mail after your visit with us. Thank you!             Your Updated Medication List - Protect others around you: Learn how to safely use, store and throw away your medicines at www.disposemymeds.org.          This list is accurate as of: 8/24/17  4:21 PM.  Always use your most recent med list.                   Brand Name Dispense Instructions for use Diagnosis    aspirin 325 MG tablet      Take 325 mg by mouth        atorvastatin 10 MG tablet    LIPITOR    90 tablet    Take 1 tablet (10 mg) by mouth daily    Mixed hyperlipidemia       LANSOPRAZOLE PO      Take by mouth every morning (before breakfast)        triamcinolone acetonide 40 MG/ML injection    KENALOG-40    0.5 mL    0.5 mLs (20 mg) by INTRA-ARTICULAR route once for 1 dose    Primary osteoarthritis of right hand       valsartan 40 MG tablet    DIOVAN    90 tablet    Take 1 tablet (40 mg) by mouth daily    Essential hypertension, benign

## 2017-08-24 NOTE — NURSING NOTE
"Chief Complaint   Patient presents with     Musculoskeletal Problem     f/u right hand - US injection       Initial /90  Ht 5' 9.5\" (1.765 m)  Wt 180 lb (81.6 kg)  BMI 26.2 kg/m2 Estimated body mass index is 26.2 kg/(m^2) as calculated from the following:    Height as of this encounter: 5' 9.5\" (1.765 m).    Weight as of this encounter: 180 lb (81.6 kg).  Medication Reconciliation: complete     Espinoza Pedersen ATC  "

## 2017-08-24 NOTE — PROGRESS NOTES
Mono Jennings  :  1955  DOS: 2017  MRN: [id number]    Sports Medicine Clinic Procedure    Clinical History: Chronic intermittent right middle finger, hand pain over last several years, worsening pain over last 3+ months.  Pain located over right middle finger, MCP joint, nonradiating.  Additional Features:  Positive: swelling, grinding and weakness.  Symptoms are better with Ibuprofen and Rest.  Symptoms are worse with: gripping/grasping objects.  Other evaluation and/or treatments so far consists of: Tylenol, Ibuprofen, Rest and PCP consult.  Recent imaging completed: X-rays completed 17.  Prior History of related problems: none  Diagnosis:   1. Primary osteoarthritis of right hand      Procedure: Ultrasound Guided right 3rd MCP Joint Injection  Consent given, and signed on chart.  Sterile prepping.    Ultrasound identification of the right side 3rd MCP Joint on both long and short axis.    The probe was placed in long axis view to the right side 3rd MCP joint.  A 1.5 inch 25 gauge needle was placed under ultrasound guidance inside the joint capsule. A mixture of 0.5ml 0.2% ropivicaine and 0.5 ml kenalog (40mg/ml) was injected without difficulty on the long axis view.    Good hemostasis and no complications.  Ultrasound documentation of needle placement and injection.  Pre-procedure pain: 6/10.  Post-procedure pain: 2/10.    Impression:  Successful US guided intraarticular corticosteroid injection of the 3rd MCP joint    Plan:  Follow up prn  Expectations and goals of CSI reviewed  Often 2-3 days for steroid effect, and can take up to two weeks for maximum effect  We discussed modified progressive pain-free activity as tolerated  Do not overuse in first two weeks if feeling better due to concern for vulnerability while steroid is working  Supportive care reviewed  All questions were answered today  Contact us with additional questions or concerns  Signs and sx of concern reviewed    Iker  DO Stevie, CAQ  Primary Care Sports Medicine  [facility]

## 2018-01-23 ENCOUNTER — ALLIED HEALTH/NURSE VISIT (OUTPATIENT)
Dept: FAMILY MEDICINE | Facility: CLINIC | Age: 63
End: 2018-01-23
Payer: COMMERCIAL

## 2018-01-23 VITALS — SYSTOLIC BLOOD PRESSURE: 130 MMHG | DIASTOLIC BLOOD PRESSURE: 86 MMHG

## 2018-01-23 DIAGNOSIS — I10 ESSENTIAL HYPERTENSION, BENIGN: Primary | ICD-10-CM

## 2018-01-23 PROCEDURE — 99207 ZZC NO CHARGE NURSE ONLY: CPT | Performed by: FAMILY MEDICINE

## 2018-01-23 NOTE — MR AVS SNAPSHOT
"              After Visit Summary   2018    Mono Jennings    MRN: [id number]           Patient Information     Date Of Birth          1955        Visit Information        Provider Department      2018 1:55 PM Jason Schmid MD [facility]        Today's Diagnoses     Essential hypertension, benign    -  1       Follow-ups after your visit        Who to contact     If you have questions or need follow up information about today's clinic visit or your schedule please contact [facility] directly at [phone].  Normal or non-critical lab and imaging results will be communicated to you by MyChart, letter or phone within 4 business days after the clinic has received the results. If you do not hear from us within 7 days, please contact the clinic through MyChart or phone. If you have a critical or abnormal lab result, we will notify you by phone as soon as possible.  Submit refill requests through [employer] or call your pharmacy and they will forward the refill request to us. Please allow 3 business days for your refill to be completed.          Additional Information About Your Visit        MyChart Information     [employer] lets you send messages to your doctor, view your test results, renew your prescriptions, schedule appointments and more. To sign up, go to [email or web address] . Click on \"Log in\" on the left side of the screen, which will take you to the Welcome page. Then click on \"Sign up Now\" on the right side of the page.     You will be asked to enter the access code listed below, as well as some personal information. Please follow the directions to create your username and password.     Your access code is: [email or web address]  Expires: 2018  1:55 PM     Your access code will  in 90 days. If you need help or a new code, please call your [facility] or [phone].        Care EveryWhere ID     This is your Care EveryWhere ID. This could be used by other " organizations to access your [place] medical records  [id number]         Blood Pressure from Last 3 Encounters:   01/23/18 130/86   08/24/17 155/90   08/22/17 (!) 169/93    Weight from Last 3 Encounters:   08/24/17 180 lb (81.6 kg)   08/22/17 180 lb (81.6 kg)   08/15/17 180 lb (81.6 kg)              Today, you had the following     No orders found for display       Primary Care Provider    None Specified       No primary provider on file.        Equal Access to Services     ELA KUO : Hadii nancy ku hadasho Soomaali, waaxda luqadaha, qaybta kaalmada adevanessayakali, iveth salas . So [facility] [phone].    ATENCIÓN: Si habla español, tiene a prajapati disposición servicios gratuitos de asistencia lingüística. Llame al [phone].    We comply with applicable federal civil rights laws and [place] laws. We do not discriminate on the basis of race, color, national origin, age, disability, sex, sexual orientation, or gender identity.            Thank you!     Thank you for choosing [facility]  for your care. Our goal is always to provide you with excellent care. Hearing back from our patients is one way we can continue to improve our services. Please take a few minutes to complete the written survey that you may receive in the mail after your visit with us. Thank you!             Your Updated Medication List - Protect others around you: Learn how to safely use, store and throw away your medicines at www.disposemymeds.org.          This list is accurate as of: 1/23/18  1:55 PM.  Always use your most recent med list.                   Brand Name Dispense Instructions for use Diagnosis    aspirin 325 MG tablet      Take 325 mg by mouth        atorvastatin 10 MG tablet    LIPITOR    90 tablet    Take 1 tablet (10 mg) by mouth daily    Mixed hyperlipidemia       LANSOPRAZOLE PO      Take by mouth every morning (before breakfast)        valsartan 40 MG tablet    DIOVAN    90 tablet     Take 1 tablet (40 mg) by mouth daily    Essential hypertension, benign

## 2018-06-26 ENCOUNTER — OFFICE VISIT (OUTPATIENT)
Dept: ORTHOPEDICS | Facility: CLINIC | Age: 63
End: 2018-06-26
Payer: COMMERCIAL

## 2018-06-26 VITALS
BODY MASS INDEX: 25.77 KG/M2 | DIASTOLIC BLOOD PRESSURE: 76 MMHG | WEIGHT: 180 LBS | HEIGHT: 70 IN | SYSTOLIC BLOOD PRESSURE: 132 MMHG

## 2018-06-26 DIAGNOSIS — M19.041 PRIMARY OSTEOARTHRITIS, RIGHT HAND: Primary | ICD-10-CM

## 2018-06-26 DIAGNOSIS — M25.541 ARTHRALGIA OF RIGHT HAND: ICD-10-CM

## 2018-06-26 PROCEDURE — 20604 DRAIN/INJ JOINT/BURSA W/US: CPT | Mod: RT | Performed by: FAMILY MEDICINE

## 2018-06-26 PROCEDURE — 99213 OFFICE O/P EST LOW 20 MIN: CPT | Mod: 25 | Performed by: FAMILY MEDICINE

## 2018-06-26 RX ORDER — ROPIVACAINE HYDROCHLORIDE 5 MG/ML
1 INJECTION, SOLUTION EPIDURAL; INFILTRATION; PERINEURAL
Status: SHIPPED | OUTPATIENT
Start: 2018-06-26

## 2018-06-26 RX ORDER — TRIAMCINOLONE ACETONIDE 40 MG/ML
40 INJECTION, SUSPENSION INTRA-ARTICULAR; INTRAMUSCULAR
Status: SHIPPED | OUTPATIENT
Start: 2018-06-26

## 2018-06-26 RX ADMIN — ROPIVACAINE HYDROCHLORIDE 1 ML: 5 INJECTION, SOLUTION EPIDURAL; INFILTRATION; PERINEURAL at 09:50

## 2018-06-26 RX ADMIN — TRIAMCINOLONE ACETONIDE 40 MG: 40 INJECTION, SUSPENSION INTRA-ARTICULAR; INTRAMUSCULAR at 09:50

## 2018-06-26 NOTE — LETTER
"    2018         RE: Mono Jennings  [id number] [facility] [address]        Dear Colleague,    Thank you for referring your patient, Mono Jennings, to the [facility]. Please see a copy of my visit note below.    Mono Jennings  :  1955  DOS: 2018  MRN: [id number]    Sports Medicine Clinic Visit    PCP: No primary care provider on file.    Mono Jennings is a 62 year old Right hand dominant male who is seen in consultation at the request of  Jason Schmid M.D. presenting with right hand, middle finger pain.    Injury: Chronic intermittent right middle finger, hand pain over last several years, worsening pain over last 3+ months.  Pain located over right middle finger, MCP joint, nonradiating.  Additional Features:  Positive: swelling, grinding and weakness.  Symptoms are better with Ibuprofen and Rest.  Symptoms are worse with: gripping/grasping objects.  Other evaluation and/or treatments so far consists of: Tylenol, Ibuprofen, Rest and PCP consult.  Recent imaging completed: X-rays completed 17.  Prior History of related problems: none    Social History: retired    Interim History - 2018  Since last visit on 17 patient has mild-moderate right middle finger pain.  Right 3rd MCP joint injection completed on  provided good relief for ~ 6 - 7 months.  Notes gradual worsening of pain over the last ~ 3 months.  No new injury in the interim.    Review of Systems  Musculoskeletal: as above  Remainder of review of systems is negative including constitutional, CV, pulmonary, GI, Skin and Neurologic except as noted in HPI or medical history.    No past medical history on file.  Past Surgical History:   Procedure Laterality Date     COLONOSCOPY N/A 8/15/2017    Procedure: COLONOSCOPY;  Colonoscopy;  Surgeon: Denny Avila MD;  Location: [facility]     HERNIA REPAIR         Objective  /76  Ht 5' 9.5\" (1.765 m)  Wt 180 lb (81.6 kg)  BMI 26.2 " kg/m2    General: healthy, alert and in no distress    HEENT: no scleral icterus or conjunctival erythema   Skin: no suspicious lesions or rash. No jaundice.   CV: regular rhythm by palpation, 2+ distal pulses, no pedal edema    Resp: normal respiratory effort without conversational dyspnea   Psych: normal mood and affect    Gait: nonantalgic, appropriate coordination and balance   Neuro: normal light touch sensory exam of the extremities. Motor strength as noted below     Right Wrist and Hand exam    Inspection:       Swelling: 3rd MCP, mild, no redness, induration or drainage    Tender:       MCP joint of 3rd digit(s)  Right, similar to previous    Non Tender:       Remainder of the Wrist and Hand right,      distal radius right,      anatomic snuffbox right,      scapholunate interval right and      TFCC right    ROM:       Decreased active and passive ROM of the 3rd MCP with flexion and extension right    Strength:       5/5 strength in the muscles of the hand, wrist and forearm right    Neurovascular:       2+ radial pulses bilaterally with brisk capillary refill and      normal sensation to light touch in the radial, median and ulnar nerve distributions    Hand / Upper Extremity Injection/Arthrocentesis  Date/Time: 6/26/2018 9:50 AM  Performed by: SUNDAR LACY  Authorized by: SUNDAR ALCY     Indications:  Pain and therapeutic  Needle Size:  25 G  Guidance: ultrasound    Approach:  Dorsal  Condition: osteoarthritis    Location:  Long finger  Site:  R long MCP  Medications:  40 mg triamcinolone acetonide 40 MG/ML; 1 mL ropivacaine 5 MG/ML  Outcome:  Tolerated well, no immediate complications  Procedure discussed: discussed risks, benefits, and alternatives    Consent Given by:  Patient  Prep: patient was prepped and draped in usual sterile fashion          Radiology:  Results for orders placed or performed in visit on 08/07/17   XR Hand Right G/E 3 Views    Narrative    RIGHT HAND THREE OR  MORE VIEWS  8/7/2017 8:38 AM     HISTORY: Pain over knuckles for about 1 year. Pain in right hand.    COMPARISON: None.      Impression    IMPRESSION:  No acute osseous abnormality. No radiopaque foreign body.  There is a well-corticated density in the ulnar carpal joint, this may  represent an old ulnar styloid fracture or joint body. Mild  degenerative changes are seen at  radiocarpal joint. Joint space  narrowing is seen at the third carpometacarpal phalangeal joint space.      AMADEO NAYAK DO       Assessment:  1. Primary osteoarthritis, right hand    2. Arthralgia of right hand        Plan:  Discussed the assessment with the patient.  Follow up: prn  Repeat US guided MCP joint CSI  RICE reviewed  Limiting motion with splint over MCP reviewed  OT options reviewed  Activity modification reviewed  XR images independently visualized and reviewed with patient again today in clinic  Moderate OA at 3rd MCP matches sx  Consider further referral for worsening pain mot improved with conservative care  PRP injection would be another consideration, discussed briefly today  Home handouts provided and supportive care reviewed  All questions were answered today  Contact us with additional questions or concerns  Signs and sx of concern reviewed      Iker Hubbard DO, ROBERT  Primary Care Sports Medicine  [facility]             Disclaimer: This note consists of symbols derived from keyboarding, dictation and/or voice recognition software. As a result, there may be errors in the script that have gone undetected. Please consider this when interpreting information found in this chart.    Again, thank you for allowing me to participate in the care of your patient.        Sincerely,        Iker Hubbard DO

## 2018-06-26 NOTE — MR AVS SNAPSHOT
"              After Visit Summary   6/26/2018    Mono Jennings    MRN: [id number]           Patient Information     Date Of Birth          1955        Visit Information        Provider Department      6/26/2018 9:00 AM Iker Hubbard,  [facility]        Today's Diagnoses     Primary osteoarthritis, right hand    -  1    Arthralgia of right hand           Follow-ups after your visit        Who to contact     If you have questions or need follow up information about today's clinic visit or your schedule please contact [facility] directly at [phone].  Normal or non-critical lab and imaging results will be communicated to you by MyChart, letter or phone within 4 business days after the clinic has received the results. If you do not hear from us within 7 days, please contact the clinic through MyChart or phone. If you have a critical or abnormal lab result, we will notify you by phone as soon as possible.  Submit refill requests through [employer] or call your pharmacy and they will forward the refill request to us. Please allow 3 business days for your refill to be completed.          Additional Information About Your Visit        Care EveryWhere ID     This is your Care EveryWhere ID. This could be used by other organizations to access your [facility] medical records  [id number]        Your Vitals Were     Height BMI (Body Mass Index)                5' 9.5\" (1.765 m) 26.2 kg/m2           Blood Pressure from Last 3 Encounters:   06/26/18 132/76   01/23/18 130/86   08/24/17 155/90    Weight from Last 3 Encounters:   06/26/18 180 lb (81.6 kg)   08/24/17 180 lb (81.6 kg)   08/22/17 180 lb (81.6 kg)              We Performed the Following     Hand / Upper Extremity Injection/Arthrocentesis        Primary Care Provider Office Phone # Fax #    Michele Contreras [phone]       [facility] [address] [facility] [address] " [id number]        Equal Access to Services     [facility] SHIELA : Hadii aad ku hadorlandconstance Negritamdaonna, wanessada kristyerikha, qaabhishekyasmeen mockjordoniveth antunez. So [facility] [phone].    ATENCIÓN: Si habla español, tiene a prajapati disposición servicios gratuitos de asistencia lingüística. Linaame al [phone].    We comply with applicable federal civil rights laws and [place] laws. We do not discriminate on the basis of race, color, national origin, age, disability, sex, sexual orientation, or gender identity.            Thank you!     Thank you for choosing [facility]  for your care. Our goal is always to provide you with excellent care. Hearing back from our patients is one way we can continue to improve our services. Please take a few minutes to complete the written survey that you may receive in the mail after your visit with us. Thank you!             Your Updated Medication List - Protect others around you: Learn how to safely use, store and throw away your medicines at www.disposemymeds.org.          This list is accurate as of 6/26/18 11:35 AM.  Always use your most recent med list.                   Brand Name Dispense Instructions for use Diagnosis    aspirin 325 MG tablet      Take 325 mg by mouth        atorvastatin 10 MG tablet    LIPITOR    90 tablet    Take 1 tablet (10 mg) by mouth daily    Mixed hyperlipidemia       LANSOPRAZOLE PO      Take by mouth every morning (before breakfast)        valsartan 40 MG tablet    DIOVAN    90 tablet    Take 1 tablet (40 mg) by mouth daily    Essential hypertension, benign

## 2018-06-26 NOTE — PROGRESS NOTES
"Mono Jennings  :  1955  DOS: 2018  MRN: [id number]    Sports Medicine Clinic Visit    PCP: No primary care provider on file.    Mono Jennings is a 62 year old Right hand dominant male who is seen in consultation at the request of  Jason Schmid M.D. presenting with right hand, middle finger pain.    Injury: Chronic intermittent right middle finger, hand pain over last several years, worsening pain over last 3+ months.  Pain located over right middle finger, MCP joint, nonradiating.  Additional Features:  Positive: swelling, grinding and weakness.  Symptoms are better with Ibuprofen and Rest.  Symptoms are worse with: gripping/grasping objects.  Other evaluation and/or treatments so far consists of: Tylenol, Ibuprofen, Rest and PCP consult.  Recent imaging completed: X-rays completed 17.  Prior History of related problems: none    Social History: retired    Interim History - 2018  Since last visit on 17 patient has mild-moderate right middle finger pain.  Right 3rd MCP joint injection completed on  provided good relief for ~ 6 - 7 months.  Notes gradual worsening of pain over the last ~ 3 months.  No new injury in the interim.    Review of Systems  Musculoskeletal: as above  Remainder of review of systems is negative including constitutional, CV, pulmonary, GI, Skin and Neurologic except as noted in HPI or medical history.    No past medical history on file.  Past Surgical History:   Procedure Laterality Date     COLONOSCOPY N/A 8/15/2017    Procedure: COLONOSCOPY;  Colonoscopy;  Surgeon: Denny Avila MD;  Location: [facility]     HERNIA REPAIR         Objective  /76  Ht 5' 9.5\" (1.765 m)  Wt 180 lb (81.6 kg)  BMI 26.2 kg/m2    General: healthy, alert and in no distress    HEENT: no scleral icterus or conjunctival erythema   Skin: no suspicious lesions or rash. No jaundice.   CV: regular rhythm by palpation, 2+ distal pulses, no pedal edema    Resp: normal respiratory " effort without conversational dyspnea   Psych: normal mood and affect    Gait: nonantalgic, appropriate coordination and balance   Neuro: normal light touch sensory exam of the extremities. Motor strength as noted below     Right Wrist and Hand exam    Inspection:       Swelling: 3rd MCP, mild, no redness, induration or drainage    Tender:       MCP joint of 3rd digit(s)  Right, similar to previous    Non Tender:       Remainder of the Wrist and Hand right,      distal radius right,      anatomic snuffbox right,      scapholunate interval right and      TFCC right    ROM:       Decreased active and passive ROM of the 3rd MCP with flexion and extension right    Strength:       5/5 strength in the muscles of the hand, wrist and forearm right    Neurovascular:       2+ radial pulses bilaterally with brisk capillary refill and      normal sensation to light touch in the radial, median and ulnar nerve distributions    Hand / Upper Extremity Injection/Arthrocentesis  Date/Time: 6/26/2018 9:50 AM  Performed by: SUNDAR LACY  Authorized by: SUNDAR LACY     Indications:  Pain and therapeutic  Needle Size:  25 G  Guidance: ultrasound    Approach:  Dorsal  Condition: osteoarthritis    Location:  Long finger  Site:  R long MCP  Medications:  40 mg triamcinolone acetonide 40 MG/ML; 1 mL ropivacaine 5 MG/ML  Outcome:  Tolerated well, no immediate complications  Procedure discussed: discussed risks, benefits, and alternatives    Consent Given by:  Patient  Prep: patient was prepped and draped in usual sterile fashion          Radiology:  Results for orders placed or performed in visit on 08/07/17   XR Hand Right G/E 3 Views    Narrative    RIGHT HAND THREE OR MORE VIEWS  8/7/2017 8:38 AM     HISTORY: Pain over knuckles for about 1 year. Pain in right hand.    COMPARISON: None.      Impression    IMPRESSION:  No acute osseous abnormality. No radiopaque foreign body.  There is a well-corticated density in the  ulnar carpal joint, this may  represent an old ulnar styloid fracture or joint body. Mild  degenerative changes are seen at  radiocarpal joint. Joint space  narrowing is seen at the third carpometacarpal phalangeal joint space.      AMADEO NAAYK DO       Assessment:  1. Primary osteoarthritis, right hand    2. Arthralgia of right hand        Plan:  Discussed the assessment with the patient.  Follow up: prn  Repeat US guided MCP joint CSI  RICE reviewed  Limiting motion with splint over MCP reviewed  OT options reviewed  Activity modification reviewed  XR images independently visualized and reviewed with patient again today in clinic  Moderate OA at 3rd MCP matches sx  Consider further referral for worsening pain mot improved with conservative care  PRP injection would be another consideration, discussed briefly today  Home handouts provided and supportive care reviewed  All questions were answered today  Contact us with additional questions or concerns  Signs and sx of concern reviewed      Iker Hubbard DO, CAQ  Primary Care Sports Medicine  [facility]             Disclaimer: This note consists of symbols derived from keyboarding, dictation and/or voice recognition software. As a result, there may be errors in the script that have gone undetected. Please consider this when interpreting information found in this chart.

## 2024-06-17 PROBLEM — Z71.89 ADVANCED DIRECTIVES, COUNSELING/DISCUSSION: Status: RESOLVED | Noted: 2017-08-07 | Resolved: 2024-06-17

## (undated) RX ORDER — PROPOFOL 10 MG/ML
INJECTION, EMULSION INTRAVENOUS
Status: DISPENSED
Start: 2017-08-15